# Patient Record
Sex: FEMALE | Race: WHITE | URBAN - METROPOLITAN AREA
[De-identification: names, ages, dates, MRNs, and addresses within clinical notes are randomized per-mention and may not be internally consistent; named-entity substitution may affect disease eponyms.]

---

## 2023-01-13 ENCOUNTER — WEB ENCOUNTER (OUTPATIENT)
Dept: URBAN - METROPOLITAN AREA CLINIC 107 | Facility: CLINIC | Age: 23
End: 2023-01-13

## 2023-01-13 ENCOUNTER — OFFICE VISIT (OUTPATIENT)
Dept: URBAN - METROPOLITAN AREA CLINIC 107 | Facility: CLINIC | Age: 23
End: 2023-01-13
Payer: COMMERCIAL

## 2023-01-13 VITALS
BODY MASS INDEX: 19.16 KG/M2 | WEIGHT: 115 LBS | HEIGHT: 65 IN | HEART RATE: 90 BPM | TEMPERATURE: 98.4 F | SYSTOLIC BLOOD PRESSURE: 114 MMHG | DIASTOLIC BLOOD PRESSURE: 72 MMHG

## 2023-01-13 DIAGNOSIS — K62.5 RECTAL BLEEDING: ICD-10-CM

## 2023-01-13 PROCEDURE — 99204 OFFICE O/P NEW MOD 45 MIN: CPT | Performed by: STUDENT IN AN ORGANIZED HEALTH CARE EDUCATION/TRAINING PROGRAM

## 2023-01-13 RX ORDER — HYDROCORTISONE ACETATE 25 MG/1
1 SUPPOSITORY SUPPOSITORY RECTAL TWICE A DAY
Qty: 20 | Refills: 0 | OUTPATIENT
Start: 2023-01-13 | End: 2023-01-23

## 2023-01-13 NOTE — HPI-TODAY'S VISIT:
Initial visit 1/13/2023; self-referred Ms. Asencio is a 22-year-old female with no significant medical history who presents to the GI clinic for evaluation of rectal bleeding.  Patient notes that this symptom has been on and off over the last 4 years.  She is originally from [place], moved to [place] and recently moved to the [facility].  She does admit to intermittent bouts of constipation, she can go up to 7 days without having a bowel movement but then can have regular bowel movements for several weeks before getting constipated again.  She has tried stool softeners without good effect.  She states that occasionally when having a bowel movement she will notice some bright red blood per rectum in the toilet bowl and on the paper after wiping.  She has noticed that over the past 1 week this bleeding has become more frequent and larger in quantity.  She otherwise feels well.  She states that occasionally she will have rectal pain with defecation.  She does admit to a 35 pound weight loss over the last year, however weight has been stable for the past 4 months.  Weight loss is associated with poor appetite and stress related to work per patient.  No family history of colon cancer.

## 2023-02-07 ENCOUNTER — WEB ENCOUNTER (OUTPATIENT)
Dept: URBAN - METROPOLITAN AREA SURGERY CENTER 25 | Facility: SURGERY CENTER | Age: 23
End: 2023-02-07

## 2023-02-13 ENCOUNTER — OFFICE VISIT (OUTPATIENT)
Dept: URBAN - METROPOLITAN AREA SURGERY CENTER 25 | Facility: SURGERY CENTER | Age: 23
End: 2023-02-13
Payer: COMMERCIAL

## 2023-02-13 DIAGNOSIS — K63.89 OTHER SPECIFIED DISEASES OF INTESTINE: ICD-10-CM

## 2023-02-13 DIAGNOSIS — K62.5 RECTAL BLEEDING: ICD-10-CM

## 2023-02-13 PROCEDURE — G8907 PT DOC NO EVENTS ON DISCHARG: HCPCS | Performed by: STUDENT IN AN ORGANIZED HEALTH CARE EDUCATION/TRAINING PROGRAM

## 2023-02-13 PROCEDURE — 45378 DIAGNOSTIC COLONOSCOPY: CPT | Performed by: STUDENT IN AN ORGANIZED HEALTH CARE EDUCATION/TRAINING PROGRAM

## 2023-03-14 ENCOUNTER — OFFICE VISIT (OUTPATIENT)
Dept: URBAN - METROPOLITAN AREA CLINIC 107 | Facility: CLINIC | Age: 23
End: 2023-03-14

## 2023-04-18 ENCOUNTER — DASHBOARD ENCOUNTERS (OUTPATIENT)
Age: 23
End: 2023-04-18

## 2023-04-18 ENCOUNTER — OFFICE VISIT (OUTPATIENT)
Dept: URBAN - METROPOLITAN AREA CLINIC 107 | Facility: CLINIC | Age: 23
End: 2023-04-18
Payer: COMMERCIAL

## 2023-04-18 VITALS
HEART RATE: 71 BPM | BODY MASS INDEX: 20.16 KG/M2 | HEIGHT: 65 IN | TEMPERATURE: 97.8 F | SYSTOLIC BLOOD PRESSURE: 111 MMHG | WEIGHT: 121 LBS | DIASTOLIC BLOOD PRESSURE: 75 MMHG | RESPIRATION RATE: 18 BRPM

## 2023-04-18 DIAGNOSIS — K60.2 ANAL FISSURE: ICD-10-CM

## 2023-04-18 PROCEDURE — 99212 OFFICE O/P EST SF 10 MIN: CPT | Performed by: STUDENT IN AN ORGANIZED HEALTH CARE EDUCATION/TRAINING PROGRAM

## 2023-04-18 NOTE — HPI-TODAY'S VISIT:
Initial visit 1/13/2023; self-referred Ms. Asencio is a 22-year-old female with no significant medical history who presents to the GI clinic for evaluation of rectal bleeding.  Patient notes that this symptom has been on and off over the last 4 years.  She is originally from [place], moved to [place] and recently moved to the [facility].  She does admit to intermittent bouts of constipation, she can go up to 7 days without having a bowel movement but then can have regular bowel movements for several weeks before getting constipated again.  She has tried stool softeners without good effect.  She states that occasionally when having a bowel movement she will notice some bright red blood per rectum in the toilet bowl and on the paper after wiping.  She has noticed that over the past 1 week this bleeding has become more frequent and larger in quantity.  She otherwise feels well.  She states that occasionally she will have rectal pain with defecation.  She does admit to a 35 pound weight loss over the last year, however weight has been stable for the past 4 months.  Weight loss is associated with poor appetite and stress related to work per patient.  No family history of colon cancer. . Follow-up visit 4/18/2023 Colonoscopy 2/13/2023 performed for evaluation of rectal bleeding: MARIAELENA normal, colon appeared normal, hypertrophied anal papillae present. Given presence of hypertrophied anal papula, there was concern for a underlying anal fissure.  Nifedipine/lidocaine ointment was prescribed, patient also recommended to use Benefiber daily. Patient states she no longer has rectal bleeding, bowel movements are soft and there is no pain with defecation. She has completed nifedipine/lidocaine ointment.